# Patient Record
Sex: MALE | Race: OTHER | Employment: OTHER | URBAN - METROPOLITAN AREA
[De-identification: names, ages, dates, MRNs, and addresses within clinical notes are randomized per-mention and may not be internally consistent; named-entity substitution may affect disease eponyms.]

---

## 2022-03-08 ENCOUNTER — CONSULTATION/EVALUATION (OUTPATIENT)
Dept: URBAN - METROPOLITAN AREA CLINIC 35 | Facility: CLINIC | Age: 72
End: 2022-03-08

## 2022-03-08 DIAGNOSIS — H35.372: ICD-10-CM

## 2022-03-08 DIAGNOSIS — H25.812: ICD-10-CM

## 2022-03-08 DIAGNOSIS — H25.811: ICD-10-CM

## 2022-03-08 DIAGNOSIS — H35.363: ICD-10-CM

## 2022-03-08 PROCEDURE — 92014 COMPRE OPH EXAM EST PT 1/>: CPT

## 2022-03-08 PROCEDURE — 92136TC INTERFEROMETRY - TECHNICAL COMPONENT

## 2022-03-08 PROCEDURE — V2799PMN IMPRIMIS PRED-MOXI-NEPAF 5ML

## 2022-03-08 ASSESSMENT — VISUAL ACUITY
OS_AM: 20/25-2
OS_CC: J8
OD_SC: 20/50-+
OS_PH: 20/50-1
OD_RAM: 20/20-2
OD_CC: J3

## 2022-03-08 ASSESSMENT — TONOMETRY
OD_IOP_MMHG: 16
OS_IOP_MMHG: 18

## 2022-03-08 NOTE — PATIENT DISCUSSION
The types of intraocular lenses were reviewed with the patient along with a discussion of their various strengths and weaknesses. Pt elects for Basic Marika.

## 2022-03-08 NOTE — PATIENT DISCUSSION
pt elects for Basic lucia OU. Pt aware to see his best distance gls will be needed and to always need gls for anything arms length in. Pt doesnt wear gls now and VA will be much clearer than now. No discussion about CV or ADV, but wanted to ins covered only.

## 2022-03-30 ENCOUNTER — POST-OP (OUTPATIENT)
Dept: URBAN - METROPOLITAN AREA CLINIC 39 | Facility: CLINIC | Age: 72
End: 2022-03-30

## 2022-03-30 ENCOUNTER — SURGERY/PROCEDURE (OUTPATIENT)
Dept: URBAN - METROPOLITAN AREA CLINIC 35 | Facility: CLINIC | Age: 72
End: 2022-03-30

## 2022-03-30 DIAGNOSIS — H25.812: ICD-10-CM

## 2022-03-30 DIAGNOSIS — Z96.1: ICD-10-CM

## 2022-03-30 PROCEDURE — 6698454 REMOVE CATARACT;INSERT LENS (SX ONLY)

## 2022-03-30 ASSESSMENT — TONOMETRY: OS_IOP_MMHG: 20

## 2022-03-30 ASSESSMENT — VISUAL ACUITY: OS_SC: 20/80

## 2022-03-30 NOTE — PATIENT DISCUSSION
Patient advised of the right to post-operative care by the surgeon. Patient is fully informed of, and agreed to, co-management with their primary optometric physician. Post-operative care by the surgeon is not medically necessary and co-management is clinically appropriate. Patient has received itemization of fees related to cataract surgery. Transfer of care letter completed for the patient. Transfer care of left eye to Dr. Janel Thompson on 3/30/22. Patient instructed to call immediately if any new distortion, blurring, decreased vision or eye pain.

## 2022-03-30 NOTE — PATIENT DISCUSSION
Please refer to the progress note completed on 4/19/21 for discharge information.   Recommended observation.

## 2022-03-30 NOTE — PATIENT DISCUSSION
Patient advised of the right to post-operative care by the surgeon. Patient is fully informed of, and agreed to, co-management with their primary optometric physician. Post-operative care by the surgeon is not medically necessary and co-management is clinically appropriate. Patient has received itemization of fees related to cataract surgery. Transfer of care letter completed for the patient. Transfer care of left eye to Dr. Scar Mccray on 3/30/22. Patient instructed to call immediately if any new distortion, blurring, decreased vision or eye pain.

## 2022-04-05 ENCOUNTER — FOLLOW UP (OUTPATIENT)
Dept: URBAN - METROPOLITAN AREA CLINIC 35 | Facility: CLINIC | Age: 72
End: 2022-04-05

## 2022-04-05 DIAGNOSIS — H35.3221: ICD-10-CM

## 2022-04-05 PROCEDURE — 92012 INTRM OPH EXAM EST PATIENT: CPT

## 2022-04-05 RX ORDER — PREDNISOLONE ACETATE 10 MG/ML: 1 SUSPENSION/ DROPS OPHTHALMIC TWICE A DAY

## 2022-04-05 ASSESSMENT — TONOMETRY
OS_IOP_MMHG: 18
OD_IOP_MMHG: 20

## 2022-04-05 ASSESSMENT — VISUAL ACUITY
OS_CC: 20/30+2
OS_SC: J12
OS_SC: 20/40